# Patient Record
Sex: MALE | Race: WHITE | Employment: STUDENT | URBAN - METROPOLITAN AREA
[De-identification: names, ages, dates, MRNs, and addresses within clinical notes are randomized per-mention and may not be internally consistent; named-entity substitution may affect disease eponyms.]

---
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
## 2024-11-02 ENCOUNTER — HOSPITAL ENCOUNTER (EMERGENCY)
Age: 24
Discharge: HOME | End: 2024-11-02
Attending: EMERGENCY MEDICINE
Payer: MEDICAID

## 2024-11-02 VITALS
BODY MASS INDEX: 23.32 KG/M2 | RESPIRATION RATE: 19 BRPM | TEMPERATURE: 98.1 F | HEART RATE: 92 BPM | DIASTOLIC BLOOD PRESSURE: 86 MMHG | HEIGHT: 65 IN | OXYGEN SATURATION: 96 % | WEIGHT: 140 LBS | SYSTOLIC BLOOD PRESSURE: 137 MMHG

## 2024-11-02 DIAGNOSIS — R45.4 OUTBURSTS OF ANGER: Primary | ICD-10-CM

## 2024-11-02 PROCEDURE — 99282 EMERGENCY DEPT VISIT SF MDM: CPT

## 2024-11-02 ASSESSMENT — PAIN - FUNCTIONAL ASSESSMENT
PAIN_FUNCTIONAL_ASSESSMENT: 0-10
PAIN_FUNCTIONAL_ASSESSMENT: NONE - DENIES PAIN

## 2024-11-02 ASSESSMENT — PAIN SCALES - GENERAL: PAINLEVEL_OUTOF10: 0

## 2024-11-02 NOTE — VIRTUAL HEALTH
Brannon Galvan  [id number]  2000     EMERGENCY DEPARTMENT TELEPSYCHIATRY EVALUATION    11/02/24    Chief Complaint:  “Argument with mother”  HPI: Patient is a 24 y.o. mixed-race male who presents for psychiatric evaluation after having an argument with his mother this afternoon. Patient presented to the ED on 11/02/24 from community.  He got into an argument with his mother about doing chores around the house.  He reports that the house is chaotic and that him and his mother and his mother's boyfriend will argue frequently.  He got frustrated and did not want to fight his mother's boyfriend today, so he decided that he would leave the house for a bit and go for a walk to calm down.  His mother is currently admitted to the hospital, so she had her friend check on him and bring him into the hospital to make sure he was okay.  He denies suicidal or homicidal ideation, and he reports that he just feels bad because he does not like when he has arguments with his family.    Collateral from mother: Patient is highly verbally disruptive at home with both herself and her boyfriend.  He was admitted to inpatient psych once this year for disruptive behavior and was going to be discharged to a homeless shelter except they didn't have any room.  He frequently yells and will sometimes break objects.  She is concerned about being able to have him live with her and is unsure of where he would be able to live if not with her.  He sees a PMHNP in the outpatient setting and several trials of mood stabilizers have resulted in unspecified allergic reactions.      Past Psychiatric History:  Previous Diagnoses/symptoms: Autism,  Anger,   Sas/self-harm: once in 2020 by cutting wrist  Inpatient psychiatric hospitalizations: denies - ER x2 - anger management program partial hospitalization  Outpatient provider: Lupis Davies - PMHNP  Not currently in therapy  Previous psychiatric medication trials: trazodone, prozac, clonidine ,  results found for this or any previous visit (from the past 48 hour(s)).    Imaging:   No orders to display     Radiology:  No results found.    Review of Systems:  Reports no current cardiovascular, respiratory, gastrointestinal, genitourinary, integumentary, neurological, musculoskeletal, or immunological symptoms today.   PSYCHIATRIC: See HPI above.    PSYCHIATRIC EXAMINATION / MENTAL STATUS EXAM    Level of consciousness:  within normal limits   Appearance:  well-appearing, street clothes, and seated in bed.  Does appear stated age. No acute distress.  Behavior/Motor: no psychomotor agitation, retardation, or abnormal movements noted  Attitude toward examiner:  cooperative, good eye contact, and withdrawn  SI/HI:Denies SI/HI  Sleep: sleeps well with trazodone and clonidine  Speech:  spontaneous, normal rate, normal volume, and well articulated ,  normal tone  Mood: sad  Affect:  euthymic, full range, non-labile, mood congruent  Thought Processes:  linear, goal directed, and coherent.  No tangentiality or circumstantiality. No flight of ideas or loosening of associations.  Thought Content:  Appropriate to circumstance. No delusions or other perceptual abnormalities.  Hallucinations: Denies AVOT-H  Cognition:  oriented to person, place, and time   Concentration: intact  Memory: intact, though not formally tested.  Insight: good   Judgement: good   Fund of Knowledge: good      Risk Assessment:  Protective Factors:  Protective: Denies depression, Denies suicidal ideation, Does not have lethal plan, Does not have access to guns, Patient is verbally chiqui for safety, No active psychosis or cognitive dysfunction, Physically healthy, Has outpatient services in place, and Compliant with recommended medications  Risk Factors:  Risk Factors: Age <25 or >55, Male gender, Depressed mood, and Prior suicide attempt    C-SSRS Score       11/2/2024     3:11 PM   C-SSRS Suicide Screening   1) Within the past month, have you  wished you were dead or wished you could go to sleep and not wake up?  No   2) Have you actually had any thoughts of killing yourself?  No   6) Have you ever done anything, started to do anything, or prepared to do anything to end your life? No    :      Overall Level Suicide Risk: TelePsych CSSRS Risk Level: Low Risk    Assessment:   Patient is a 24 y.o.  male who presents for psychiatric evaluation after having an argument with his mother and her boyfriend today.  He denies SI/HI/AVH and is calm and cooperative with psychiatric interview.  He reports feeling some sadness over the argument today, and he does not like that there are so many arguments within the home, but he is amiable to returning home and states \"I just needed some time to calm down today so I left for a bit.\"    Dx:   Autism Spectrum Disorder   Intermittent Explosive Disorder    Plan:  The patient is cleared to be discharged from a psychiatric point of view, when medically appropriate.  Patient does not meet criteria for a psychiatric hold at this time. He should follow up in the outpatient setting with his therapist and psychopharmacologist.  Legal Status: NA  OK to d/c suicide and elopement precautions.  Medical co-morbidities: Management per medical providers, appreciate assistance.  Medication Recommendations:  Continue outpatient regimen  Reviewed treatment plan with patient including risks, benefits, alternatives, and side effects of medications, and any/all black box warnings. Patient verbalized understanding.  Patient had an opportunity to ask questions and address concerns. Obtained informed consent for treatment.  Medical records, labs, and diagnostic tests reviewed.   Re-consult for any new changes or concerns. Thank you for this consult.  Discussed recommendations with Dr. Lakia DO at time of consult completion.    TelePsych recommendations:Discharge    Legal hold: No Involuntary Hold    Telepsychiatry will sign off. Thank

## 2024-11-02 NOTE — ED TRIAGE NOTES
Patient to the ED via walk in with no complaint. Pt states he and his mom were having a disagreement because he never cleans up after himself and the patient became frustrated because his mom makes him do chores at her house (he resides in the home). Pt states his mom's friend, Sofia drove him here and dropped him off. Pt states he has no complaints, is upset, but not in mental distress over the disagreement, and denies SI/HI. Pt states, \"I don't really know why I am here.\"  Pt confirms he has been admitted for mental health before but that today is not similar to his previous visits

## 2024-11-08 NOTE — ED PROVIDER NOTES
Emergency Department Encounter  Location: [facility] EMERGENCY DEPARTMENT    Patient: Brannon Galvan  MRN: [id number]  : 2000  Date of evaluation: 2024  ED Provider: Virgen Dorman DO    Chief Complaint:    Mental Health Problem    [place]:  Brannon Galvan is a 24 y.o. male that presents to the emergency department with initial concerns of a problem.  Patient states that he got into a fight with family member and then took a walk away from the home.  Family member then sent someone to pick him up and brought him here evaluation.  Patient admits he was angry today but denies any threats to harm himself or others.  States he feels safe and denies any intent to harm himself or others.  Has a history of prior suicide attempt but states \"I promised I would never do it again\".  States he been sleeping well and has a stable appetite.      History reviewed. No pertinent past medical history.  History reviewed. No pertinent surgical history.  History reviewed. No pertinent family history.  Social History     Socioeconomic History    Marital status: Single     Spouse name: Not on file    Number of children: Not on file    Years of education: Not on file    Highest education level: Not on file   Occupational History    Not on file   Tobacco Use    Smoking status: Never    Smokeless tobacco: Never   Vaping Use    Vaping status: Every Day   Substance and Sexual Activity    Alcohol use: No    Drug use: No    Sexual activity: Not on file   Other Topics Concern    Not on file   Social History Narrative    Not on file     Social Determinants of Health     Financial Resource Strain: Not on file   Food Insecurity: Not on file   Transportation Needs: Not on file   Physical Activity: Not on file   Stress: Not on file   Social Connections: Not on file   Intimate Partner Violence: Not on file   Housing Stability: Not on file     No current facility-administered medications for this encounter.  Respirations unlabored. CTAB.  ABDOMEN: Soft. Non-distended. Non-tender.    EXTREMITIES: No acute deformities.   SKIN: Warm and dry.   NEUROLOGICAL: No gross facial drooping. Moves all 4 extremities spontaneously.   PSYCHIATRIC: Normal mood.         CC/HPI Summary, DDx, ED Course, and Reassessment:   Patient is calm and cooperative on arrival to the ED.  He denies any SI or HI.  He is rational, able to answer all questions appropriately.    I did discuss the patient speak with telepsych who concurs that patient is not admitted to himself or others.  He was able to obtain collateral from patient's family who confirms that patient does have angry outbursts but does not report any threats to harm himself or others.      History from : Patient      Patient was given the following medications:  Medications - No data to display    Discussion with Other Profesionals : Consultant discussed with telepsych consultant as above.      Disposition Considerations (tests considered but not done, Shared Decision Making, Pt Expectation of Test or Tx.):     At this time, we have agreed patient does not require inpatient psychiatric care.  He is established with a psychiatrist and therapist and is encouraged to continue with them for ongoing outpatient management.  He is encouraged to return with any changes or concerns. Patient is given instructions regarding symptomatic care at home as well as return precautions. To call psychiatrist or therapist for follow up in 2-3 days. Patient verbalizes understanding of all instructions and is comfortable with the plan of care.       I am the Primary Clinician of Record.    Final Impression:  1. Outbursts of anger      DISPOSITION Decision To Discharge 11/02/2024 06:08:40 PM           Patient referred to:    Please follow up with your therapist and psychiatrist as soon as possible.        [facility] Emergency Department  [address] [place] [address]

## 2025-07-15 ENCOUNTER — HOSPITAL ENCOUNTER (OUTPATIENT)
Age: 25
Discharge: HOME | End: 2025-07-15
Payer: COMMERCIAL

## 2025-07-15 LAB
ALBUMIN SERPL-MCNC: 4.3 G/DL (ref 3.4–5)
ALBUMIN/GLOB SERPL: 2.2 {RATIO} (ref 1.1–2.2)
ALP SERPL-CCNC: 48 U/L (ref 40–129)
ALT SERPL-CCNC: 9 U/L (ref 10–40)
ANION GAP SERPL CALCULATED.3IONS-SCNC: 9 MMOL/L (ref 9–17)
AST SERPL-CCNC: 14 U/L (ref 15–37)
BILIRUB SERPL-MCNC: 0.2 MG/DL (ref 0–1)
BUN SERPL-MCNC: 13 MG/DL (ref 7–20)
CALCIUM SERPL-MCNC: 9.4 MG/DL (ref 8.3–10.6)
CHLORIDE SERPL-SCNC: 105 MMOL/L (ref 99–110)
CO2 SERPL-SCNC: 24 MMOL/L (ref 21–32)
CREAT SERPL-MCNC: 1.1 MG/DL (ref 0.9–1.3)
GFR, ESTIMATED: 85 ML/MIN/1.73M2
GLUCOSE SERPL-MCNC: 89 MG/DL (ref 74–99)
POTASSIUM SERPL-SCNC: 4.1 MMOL/L (ref 3.5–5.1)
PROT SERPL-MCNC: 6.2 G/DL (ref 6.4–8.2)
SODIUM SERPL-SCNC: 138 MMOL/L (ref 136–145)

## 2025-07-15 PROCEDURE — 80053 COMPREHEN METABOLIC PANEL: CPT
